# Patient Record
Sex: FEMALE | Race: WHITE | NOT HISPANIC OR LATINO | Employment: OTHER | URBAN - NONMETROPOLITAN AREA
[De-identification: names, ages, dates, MRNs, and addresses within clinical notes are randomized per-mention and may not be internally consistent; named-entity substitution may affect disease eponyms.]

---

## 2023-03-23 ENCOUNTER — APPOINTMENT (OUTPATIENT)
Dept: OTHER | Facility: HOSPITAL | Age: 68
End: 2023-03-23
Payer: MEDICARE

## 2023-03-23 ENCOUNTER — HOSPITAL ENCOUNTER (OUTPATIENT)
Dept: GENERAL RADIOLOGY | Facility: HOSPITAL | Age: 68
Discharge: HOME | End: 2023-03-23
Payer: MEDICARE

## 2023-03-23 ENCOUNTER — OFFICE VISIT (OUTPATIENT)
Dept: NEUROSURGERY | Facility: CLINIC | Age: 68
End: 2023-03-23
Payer: MEDICARE

## 2023-03-23 VITALS — WEIGHT: 144.6 LBS | HEIGHT: 67 IN | BODY MASS INDEX: 22.7 KG/M2

## 2023-03-23 DIAGNOSIS — Z78.9 NON-SMOKER: ICD-10-CM

## 2023-03-23 DIAGNOSIS — S32.040A CLOSED COMPRESSION FRACTURE OF L4 VERTEBRA, INITIAL ENCOUNTER: Primary | ICD-10-CM

## 2023-03-23 PROCEDURE — 99204 OFFICE O/P NEW MOD 45 MIN: CPT | Performed by: NURSE PRACTITIONER

## 2023-03-23 PROCEDURE — 1159F MED LIST DOCD IN RCRD: CPT | Performed by: NURSE PRACTITIONER

## 2023-03-23 PROCEDURE — 1160F RVW MEDS BY RX/DR IN RCRD: CPT | Performed by: NURSE PRACTITIONER

## 2023-03-23 PROCEDURE — 72114 X-RAY EXAM L-S SPINE BENDING: CPT

## 2023-03-23 RX ORDER — ATORVASTATIN CALCIUM 20 MG/1
TABLET, FILM COATED ORAL
COMMUNITY
Start: 2022-12-27

## 2023-03-23 RX ORDER — LOSARTAN POTASSIUM 100 MG/1
TABLET ORAL
COMMUNITY
Start: 2023-03-15

## 2023-03-23 RX ORDER — LANOLIN ALCOHOL/MO/W.PET/CERES
1000 CREAM (GRAM) TOPICAL DAILY
COMMUNITY

## 2023-03-23 RX ORDER — CETIRIZINE HYDROCHLORIDE 10 MG/1
10 TABLET ORAL DAILY
COMMUNITY

## 2023-03-23 RX ORDER — METOPROLOL SUCCINATE 50 MG/1
TABLET, EXTENDED RELEASE ORAL
COMMUNITY
Start: 2023-02-13

## 2023-03-23 RX ORDER — CEPHALEXIN 250 MG/1
CAPSULE ORAL
COMMUNITY
Start: 2023-02-08

## 2023-03-23 RX ORDER — CLOPIDOGREL BISULFATE 75 MG/1
TABLET ORAL
COMMUNITY
Start: 2023-01-11

## 2023-03-23 NOTE — PATIENT INSTRUCTIONS
Advance Care Planning and Advance Directives     You make decisions on a daily basis - decisions about where you want to live, your career, your home, your life. Perhaps one of the most important decisions you face is your choice for future medical care. Take time to talk with your family and your healthcare team and start planning today.  Advance Care Planning is a process that can help you:  Understand possible future healthcare decisions in light of your own experiences  Reflect on those decision in light of your goals and values  Discuss your decisions with those closest to you and the healthcare professionals that care for you  Make a plan by creating a document that reflects your wishes    Surrogate Decision Maker  In the event of a medical emergency, which has left you unable to communicate or to make your own decisions, you would need someone to make decisions for you.  It is important to discuss your preferences for medical treatment with this person while you are in good health.     Qualities of a surrogate decision maker:  Willing to take on this role and responsibility  Knows what you want for future medical care  Willing to follow your wishes even if they don't agree with them  Able to make difficult medical decisions under stressful circumstances    Advance Directives  These are legal documents you can create that will guide your healthcare team and decision maker(s) when needed. These documents can be stored in the electronic medical record.    Living Will - a legal document to guide your care if you have a terminal condition or a serious illness and are unable to communicate. States vary by statute in document names/types, but most forms may include one or more of the following:        -  Directions regarding life-prolonging treatments        -  Directions regarding artificially provided nutrition/hydration        -  Choosing a healthcare decision maker        -  Direction regarding organ/tissue  donation    Durable Power of  for Healthcare - this document names an -in-fact to make medical decisions for you, but it may also allow this person to make personal and financial decisions for you. Please seek the advice of an  if you need this type of document.    **Advance Directives are not required and no one may discriminate against you if you do not sign one.    Medical Orders  Many states allow specific forms/orders signed by your physician to record your wishes for medical treatment in your current state of health. This form, signed in personal communication with your physician, addresses resuscitation and other medical interventions that you may or may not want.      For more information or to schedule a time with a [facility] Advance Care Planning Facilitator contact: [email or web address] or call [phone] and someone will contact you directly.

## 2023-03-23 NOTE — PROGRESS NOTES
Chief complaint:   Chief Complaint   Patient presents with   • Back Pain     Pt here for LBP. Pt has not had any physical therapy, pain mgmt or see chiropractor.        Subjective     HPI: This is a 67-year-old female patient who was referred to us by Dr. Avelina Davidson for back pain.  She is here to be evaluated today.  The patient says that she does deal with a degree of chronic back pain however the pain did get worse on February 3 2023 when she tried to  her 65 pound blood hound dog.  The patient says that she felt a pop in her back.  The pain was intense at that time.  She says that over the last month the pain has been resolving and she does feel like she is back to her baseline level of back pain.  Currently the pain in her back is constant.  Is worse with certain positions.  It is better with chiropractic adjustments although she has not had a chiropractic adjustment and over a month.  She denies any lower extremity pain or numbness and tingling.  Denies any bowel or bladder incontinence.  She has not done any recent physical therapy or chiropractic care since the onset of the worsening back pain.  She has not had any pain management injections.  She is right-hand dominant.  She does take Plavix due to having a previous stroke.  She is retired.  She is .  She does smoke cigarettes.  She does drink alcohol occasionally.  Denies any illicit drug use    Review of Systems   Constitutional: Positive for activity change.   HENT: Positive for congestion, postnasal drip and sneezing.    Eyes: Positive for itching.   Musculoskeletal: Positive for back pain, neck pain and neck stiffness.   Allergic/Immunologic: Positive for environmental allergies.   Neurological: Positive for headaches.   Hematological: Bruises/bleeds easily.   Psychiatric/Behavioral: Positive for sleep disturbance. The patient is nervous/anxious.    All other systems reviewed and are negative.       No past medical history on  "file.  No past surgical history on file.  No family history on file.     (Not in a hospital admission)    Allergies:  Topical sulfur, Wellbutrin [bupropion], and Drug ingredient [trazodone]    Objective      Vital Signs  Ht 170.2 cm (67\")   Wt 65.6 kg (144 lb 9.6 oz)   BMI 22.65 kg/m²     Physical Exam  Constitutional:       Appearance: Normal appearance. She is well-developed.   HENT:      Head: Normocephalic.   Eyes:      General: Lids are normal.      Extraocular Movements: EOM normal.      Conjunctiva/sclera: Conjunctivae normal.      Pupils: Pupils are equal, round, and reactive to light.   Pulmonary:      Effort: Pulmonary effort is normal.      Breath sounds: Normal breath sounds.   Musculoskeletal:         General: Normal range of motion.      Cervical back: Normal range of motion.   Skin:     General: Skin is warm.   Neurological:      Mental Status: She is alert and oriented to person, place, and time.      GCS: GCS eye subscore is 4. GCS verbal subscore is 5. GCS motor subscore is 6.      Cranial Nerves: No cranial nerve deficit.      Sensory: No sensory deficit.      Motor: Motor strength is normal.      Gait: Gait is intact.      Deep Tendon Reflexes: Reflexes are normal and symmetric. Reflexes normal.   Psychiatric:         Speech: Speech normal.         Behavior: Behavior normal.         Thought Content: Thought content normal.         Neurologic Exam     Mental Status   Oriented to person, place, and time.   Attention: normal. Concentration: normal.   Speech: speech is normal   Level of consciousness: alert  Normal comprehension.     Cranial Nerves     CN II   Visual fields full to confrontation.     CN III, IV, VI   Pupils are equal, round, and reactive to light.  Extraocular motions are normal.     CN V   Facial sensation intact.     CN VII   Facial expression full, symmetric.     CN VIII   CN VIII normal.     CN IX, X   CN IX normal.   CN X normal.     CN XI   CN XI normal.     CN XII   CN XII " normal.     Motor Exam   Muscle bulk: normal    Strength   Strength 5/5 throughout.     Sensory Exam   Light touch normal.     Gait, Coordination, and Reflexes     Gait  Gait: normal    Reflexes   Reflexes 2+ except as noted.       Imaging review: MRI of the lumbar spine that was done on February 21, 2023 shows a inferior endplate compression deformity of L4.  At L4-5 there is bilateral foraminal narrowing with the left being worse than the right.  The conus does terminate at L1.     L4-5      Assessment/Plan: The patient does have an L4 compression fracture.  She does feel like her pain has improved and she is back to her baseline level of back pain.  I am going to send her for set of x-rays of the lumbar spine to ensure that the compression fracture has not further compressed.  If the fracture is stable we can consider sending her to physical therapy.  If we do therapy she would like to go to [facility].  I will plan to see her back in the office in 6 to 8 weeks.  Patient is a nonsmoker  The patient's Body mass index is 22.65 kg/m².. BMI is within normal parameters. No follow-up required.  Advance Care Planning   ACP discussion was held with the patient during this visit. Patient does not have an advance directive, information provided.  STEADI Fall Risk Assessment was completed, and patient is at LOW risk for falls.Assessment completed on:3/23/2023       Diagnoses and all orders for this visit:    1. Closed compression fracture of L4 vertebra, initial encounter (HCC) (Primary)  -     XR Spine Lumbar Complete With Flex & Ext    2. Non-smoker    3. Body mass index (BMI) of 22.0 to 22.9 in adult          I discussed the patients findings and my recommendations with patient    Jerad Mahan, APRN  03/23/23  13:37 CDT

## 2023-03-24 ENCOUNTER — TELEPHONE (OUTPATIENT)
Dept: NEUROSURGERY | Facility: CLINIC | Age: 68
End: 2023-03-24
Payer: MEDICARE

## 2023-03-24 DIAGNOSIS — S32.040A CLOSED COMPRESSION FRACTURE OF L4 VERTEBRA, INITIAL ENCOUNTER: Primary | ICD-10-CM

## 2023-03-24 NOTE — TELEPHONE ENCOUNTER
----- Message from BARB Tatum sent at 3/24/2023  9:25 AM CDT -----  Her fracture is stable.  I did place an order for physical therapy if you can fax it to [facility] and call the patient and let her know to get started into therapy and then we will see her back in 6 to 8 weeks  ----- Message -----  From: Bruno [facility] [place]  Sent: 3/23/2023   3:28 PM CDT  To: BARB Tatum       No

## 2023-03-24 NOTE — TELEPHONE ENCOUNTER
Placed a call to inform pt her fracture is stable and Jerad has placed an order for physical therapy informed pt she can call and get set up for therapy and we will se her back in the office 6-8wks. Ended call with pt understanding and acknowledgement.

## 2023-03-27 ENCOUNTER — TELEPHONE (OUTPATIENT)
Dept: NEUROSURGERY | Facility: CLINIC | Age: 68
End: 2023-03-27

## 2023-03-27 NOTE — TELEPHONE ENCOUNTER
Caller: Albertina PATTON    Relationship: Self    Best call back number: [phone]    What orders are you requesting (i.e. lab or imaging):PHYSCIAL THERAPY    In what timeframe would the patient need to come in:ASAP    Where will you receive your lab/imaging services:[facility]    Additional notes:SURY FROM [facility] CALLED TO CHECK ON REFERRAL ORDER FOR PHYSICAL THERAPY-SHE ASKED TO HAVE IT FAXED TO [phone] THIS WILL COME STRAIGHT TO THE  FOR REGISTRATION

## 2023-03-27 NOTE — TELEPHONE ENCOUNTER
Caller: Albertina PATTON    Relationship to patient: Self    Best call back number: [phone]    Patient is needing:    PATIENT CALLED PT AT [facility] AND THEY ADVISED HER THEY HAD NOT REC'D A REFERRAL.    PLEASE RE-FAX

## 2023-03-27 NOTE — TELEPHONE ENCOUNTER
Placed a call to inform pt physical therapy order has been re-faxed to [facility]. Ended call with pt understanding and acknowledgment.